# Patient Record
Sex: FEMALE | URBAN - METROPOLITAN AREA
[De-identification: names, ages, dates, MRNs, and addresses within clinical notes are randomized per-mention and may not be internally consistent; named-entity substitution may affect disease eponyms.]

---

## 2018-05-03 ENCOUNTER — APPOINTMENT (RX ONLY)
Dept: URBAN - METROPOLITAN AREA CLINIC 23 | Facility: CLINIC | Age: 83
Setting detail: DERMATOLOGY
End: 2018-05-03

## 2018-05-03 DIAGNOSIS — Z85.828 PERSONAL HISTORY OF OTHER MALIGNANT NEOPLASM OF SKIN: ICD-10-CM

## 2018-05-03 DIAGNOSIS — L82.0 INFLAMED SEBORRHEIC KERATOSIS: ICD-10-CM

## 2018-05-03 PROCEDURE — ? COUNSELING

## 2018-05-03 PROCEDURE — 17110 DESTRUCTION B9 LES UP TO 14: CPT

## 2018-05-03 PROCEDURE — ? LIQUID NITROGEN

## 2018-05-03 PROCEDURE — ? SUNSCREEN RECOMMENDATIONS

## 2018-05-03 PROCEDURE — 99212 OFFICE O/P EST SF 10 MIN: CPT | Mod: 25

## 2018-05-03 ASSESSMENT — LOCATION DETAILED DESCRIPTION DERM
LOCATION DETAILED: MIDDLE STERNUM
LOCATION DETAILED: UPPER STERNUM
LOCATION DETAILED: RIGHT MEDIAL SUPERIOR CHEST

## 2018-05-03 ASSESSMENT — LOCATION ZONE DERM: LOCATION ZONE: TRUNK

## 2018-05-03 ASSESSMENT — LOCATION SIMPLE DESCRIPTION DERM: LOCATION SIMPLE: CHEST

## 2018-05-03 NOTE — PROCEDURE: LIQUID NITROGEN
Render Post-Care Instructions In Note?: no
Post-Care Instructions: I reviewed with the patient in detail post-care instructions. Patient is to wear sunprotection, and avoid picking at any of the treated lesions. Pt may apply Vaseline to crusted or scabbing areas.
Medical Necessity Information: It is in your best interest to select a reason for this procedure from the list below. All of these items fulfill various CMS LCD requirements except the new and changing color options.
Detail Level: Simple
Medical Necessity Clause: This procedure was medically necessary because the lesions that were treated were:
Consent: The patient's consent was obtained including but not limited to risks of crusting, scabbing, blistering, scarring, darker or lighter pigmentary change, recurrence, incomplete removal and infection.

## 2018-05-03 NOTE — PROCEDURE: SUNSCREEN RECOMMENDATIONS

## 2018-08-16 ENCOUNTER — APPOINTMENT (RX ONLY)
Dept: URBAN - METROPOLITAN AREA CLINIC 23 | Facility: CLINIC | Age: 83
Setting detail: DERMATOLOGY
End: 2018-08-16

## 2018-08-16 DIAGNOSIS — Z41.9 ENCOUNTER FOR PROCEDURE FOR PURPOSES OTHER THAN REMEDYING HEALTH STATE, UNSPECIFIED: ICD-10-CM

## 2018-08-16 PROCEDURE — ? FILLERS

## 2018-08-16 NOTE — PROCEDURE: FILLERS
Additional Area 2 Volume In Cc: 0
Use Map Statement For Sites (Optional): No
Lot #: [id number]
Additional Area 1 Volume In Cc: 1
Anesthesia Volume In Cc: 0.2
Additional Area 1 Location: perioral fine lines, lateral mouth, marionette lines
Additional Area 2 Location: temples using an acannula
Additional Area 3 Location: lateral eyes, nasal root
Filler: Restylane-L
Lot #: [id number] [facility]
Additional Area 4 Location: cheeks/Marionette
Topical Anesthesia?: 3% lidocaine, 6% prilocaine
Consent: Written consent obtained. Risks include but not limited to bruising, beading, irregular texture, ulceration, infection, allergic reaction, scar formation, incomplete augmentation, temporary nature, procedural pain.
Additional Anesthesia Volume In Cc: 6
Anesthesia Type: 1% lidocaine without epinephrine
Additional Area 1 Location: using the cannula to bilateral cheeks, marionette lines
Additional Area 1 Location: bilateral cheeks, marionette lines, lateral mouth
Expiration Date (Month Year): 02/2020
Filler: Lorri Smith
Map Statment: See [address] for Complete Details
Post-Care Instructions: Patient instructed to apply ice to reduce swelling.
Additional Area 5 Location: NLFs,tear trough  and lips
Expiration Date (Month Year): 01/31/2021
Additional Area 2 Location: cheeks, lateral jawlines, lateral mouth
Price (Use Numbers Only, No Special Characters Or $): 0.00
Lot #: [id number]
Detail Level: Zone

## 2019-05-16 ENCOUNTER — APPOINTMENT (RX ONLY)
Dept: URBAN - METROPOLITAN AREA CLINIC 23 | Facility: CLINIC | Age: 84
Setting detail: DERMATOLOGY
End: 2019-05-16

## 2019-05-16 DIAGNOSIS — Z85.828 PERSONAL HISTORY OF OTHER MALIGNANT NEOPLASM OF SKIN: ICD-10-CM

## 2019-05-16 DIAGNOSIS — L82.1 OTHER SEBORRHEIC KERATOSIS: ICD-10-CM

## 2019-05-16 DIAGNOSIS — Z41.9 ENCOUNTER FOR PROCEDURE FOR PURPOSES OTHER THAN REMEDYING HEALTH STATE, UNSPECIFIED: ICD-10-CM

## 2019-05-16 PROCEDURE — ? FILLERS

## 2019-05-16 PROCEDURE — ? SUNSCREEN RECOMMENDATIONS

## 2019-05-16 PROCEDURE — ? COUNSELING

## 2019-05-16 PROCEDURE — 99214 OFFICE O/P EST MOD 30 MIN: CPT

## 2019-05-16 ASSESSMENT — LOCATION SIMPLE DESCRIPTION DERM: LOCATION SIMPLE: ABDOMEN

## 2019-05-16 ASSESSMENT — LOCATION ZONE DERM: LOCATION ZONE: TRUNK

## 2019-05-16 ASSESSMENT — LOCATION DETAILED DESCRIPTION DERM: LOCATION DETAILED: EPIGASTRIC SKIN

## 2019-05-16 NOTE — PROCEDURE: FILLERS
Tear Troughs Filler  Volume In Cc: 0
Expiration Date (Month Year): 09/2021
Expiration Date (Month Year): 10/31/2021
Include Cannula Information In Note?: No
Lot #: [id number]
Anesthesia Type: 1% lidocaine without epinephrine
Include Cannula Brand?: DermaSculpt
Expiration Date (Month Year): 08/31/2019
Include Cannula Size?: 25G
Additional Area 1 Location: lateral mouth, fine lines perioral, marionette lines, lateral cheeks, vermillion lips
Additional Anesthesia Volume In Cc: 6
Include Cannula Length?: 1.5 inch
Additional Area 1 Location: lateral mouth, perioral fine lines, vermillion lips, marionette lines
Additional Area 1 Location: cheeks
Consent: Written consent obtained. Risks include but not limited to bruising, beading, irregular texture, ulceration, infection, allergic reaction, scar formation, incomplete augmentation, temporary nature, procedural pain.
Post-Care Instructions: Patient instructed to apply ice to reduce swelling.
Additional Area 2 Location: Lips, oral commissure, glabellar fine fines
Additional Area 2 Location: Nasalabial, Glabellar fine lines- Complimentary
Additional Area 2 Location: cheeks, jawline, oral commissure
Additional Area 3 Location: Glabbellar fine lines, Nasalabial folds, Marionette lines, vermillion lip
Additional Area 3 Location: marionette lines, lateral Jawline, lateral mouth
Additional Area 3 Volume In Cc: 1
Filler: Lorri Smith
Additional Area 4 Location: Perioral
Detail Level: Zone
Additional Area 4 Location: cheeks, lateral jawline, medial cheeks fine lines
Price (Use Numbers Only, No Special Characters Or $): 0.00
Additional Area 5 Location: Cheeks, vermillion lips, marionette fine lines, glabellar fine lines, lateral mouth
Lot #: [id number]
Lot #: [id number]
Map Statment: See [address] for Complete Details

## 2019-09-30 ENCOUNTER — APPOINTMENT (RX ONLY)
Dept: URBAN - METROPOLITAN AREA CLINIC 23 | Facility: CLINIC | Age: 84
Setting detail: DERMATOLOGY
End: 2019-09-30

## 2019-09-30 DIAGNOSIS — Z41.9 ENCOUNTER FOR PROCEDURE FOR PURPOSES OTHER THAN REMEDYING HEALTH STATE, UNSPECIFIED: ICD-10-CM

## 2019-09-30 DIAGNOSIS — L57.0 ACTINIC KERATOSIS: ICD-10-CM

## 2019-09-30 DIAGNOSIS — Z85.828 PERSONAL HISTORY OF OTHER MALIGNANT NEOPLASM OF SKIN: ICD-10-CM

## 2019-09-30 PROCEDURE — 99214 OFFICE O/P EST MOD 30 MIN: CPT | Mod: 25

## 2019-09-30 PROCEDURE — ? DYSPORT

## 2019-09-30 PROCEDURE — 17000 DESTRUCT PREMALG LESION: CPT

## 2019-09-30 PROCEDURE — ? LIQUID NITROGEN

## 2019-09-30 PROCEDURE — ? SUNSCREEN RECOMMENDATIONS

## 2019-09-30 PROCEDURE — ? FILLERS

## 2019-09-30 PROCEDURE — ? COUNSELING

## 2019-09-30 PROCEDURE — 17003 DESTRUCT PREMALG LES 2-14: CPT

## 2019-09-30 ASSESSMENT — LOCATION SIMPLE DESCRIPTION DERM
LOCATION SIMPLE: RIGHT FOREHEAD
LOCATION SIMPLE: LEFT FOREHEAD
LOCATION SIMPLE: LEFT UPPER BACK

## 2019-09-30 ASSESSMENT — LOCATION ZONE DERM
LOCATION ZONE: FACE
LOCATION ZONE: TRUNK

## 2019-09-30 ASSESSMENT — LOCATION DETAILED DESCRIPTION DERM
LOCATION DETAILED: RIGHT SUPERIOR LATERAL FOREHEAD
LOCATION DETAILED: LEFT MID-UPPER BACK
LOCATION DETAILED: LEFT SUPERIOR LATERAL FOREHEAD

## 2019-09-30 NOTE — PROCEDURE: DYSPORT
Nasal Root Units: 0
Detail Level: Simple
Expiration Date (Month Year): 11/30/2019
Additional Area 2 Location: lateral brows
Lot #: [id number]
Additional Area 1 Location: lat brow
Price (Use Numbers Only, No Special Characters Or $): 0.00
Additional Area 3 Location: above brows
Glabellar Complex Units: [address] [facility]
Additional Area 5 Location: glabella
Dilution (U/ 0.1cc): 1.5
Consent: Written consent obtained. Risks include but not limited to lid/brow ptosis, bruising, swelling, diplopia, temporary effect, incomplete chemical denervation.
Additional Area 4 Location: 2cm high forehead

## 2019-09-30 NOTE — PROCEDURE: MIPS QUALITY
Quality 131: Pain Assessment And Follow-Up: Pain assessment using a standardized tool is documented as negative, no follow-up plan required
Detail Level: Detailed
Additional Notes: 0/10 pain

## 2019-09-30 NOTE — PROCEDURE: FILLERS
Additional Area 2 Volume In Cc: 0
Additional Area 2 Volume In Cc: 1
Additional Area 3 Location: Glabbellar fine lines, Nasalabial folds, Marionette lines, vermillion lip
Filler: Lorri Smith
Additional Area 4 Location: Perioral
Additional Area 4 Location: cheeks, lateral jawline, medial cheeks fine lines
Additional Area 5 Location: Cheeks, vermillion lips, marionette fine lines, glabellar fine lines, lateral mouth
Consent: Written consent obtained. Risks include but not limited to bruising, beading, irregular texture, ulceration, infection, allergic reaction, scar formation, incomplete augmentation, temporary nature, procedural pain.
Lot #: [id number]
Detail Level: Zone
Post-Care Instructions: Patient instructed to apply ice to reduce swelling.
Lot #: [id number]
Lot #: [id number]
Price (Use Numbers Only, No Special Characters Or $): 0.00
Include Cannula Information In Note?: No
Expiration Date (Month Year): 09/2021
Expiration Date (Month Year): 2020-02-28
Expiration Date (Month Year): 08/31/2019
Include Cannula Brand?: DermaSculpt
Map Statment: See [address] for Complete Details
Include Cannula Size?: 25G
Anesthesia Type: 1% lidocaine without epinephrine
Additional Area 1 Location: lateral mouth, fine lines perioral, marionette lines, lateral cheeks, vermillion lips
Include Cannula Length?: 1.5 inch
Additional Anesthesia Volume In Cc: 6
Additional Area 1 Location: lateral mouth, perioral fine lines, vermillion lips, marionette lines
Additional Area 1 Location: cheeks
Additional Area 2 Location: Nasalabial, Glabellar fine lines- Complimentary
Additional Area 2 Location: Lips, oral commissure, glabellar fine fines
Additional Area 2 Location: cheeks, jawline, oral commissure, perioral fines, marionette lines

## 2019-09-30 NOTE — PROCEDURE: LIQUID NITROGEN
Number Of Freeze-Thaw Cycles: 1 freeze-thaw cycle
Consent: The patient's consent was obtained including but not limited to risks of crusting, scabbing, blistering, scarring, darker or lighter pigmentary change, recurrence, incomplete removal and infection.
Detail Level: Detailed
Render Note In Bullet Format When Appropriate: No
Duration Of Freeze Thaw-Cycle (Seconds): 10
Post-Care Instructions: I reviewed with the patient in detail post-care instructions. Patient is to wear sunprotection, and avoid picking at any of the treated lesions. Pt may apply Vaseline to crusted or scabbing areas.

## 2020-05-18 ENCOUNTER — APPOINTMENT (RX ONLY)
Dept: URBAN - METROPOLITAN AREA CLINIC 23 | Facility: CLINIC | Age: 85
Setting detail: DERMATOLOGY
End: 2020-05-18

## 2020-05-18 DIAGNOSIS — Z41.9 ENCOUNTER FOR PROCEDURE FOR PURPOSES OTHER THAN REMEDYING HEALTH STATE, UNSPECIFIED: ICD-10-CM

## 2020-05-18 PROCEDURE — ? FILLERS

## 2020-05-18 PROCEDURE — ? DYSPORT

## 2020-05-18 NOTE — PROCEDURE: FILLERS
Additional Area 3 Volume In Cc: 0
Lot #: [id number]
Filler: Lorri Smith
Detail Level: Zone
Expiration Date (Month Year): 09/2021
Include Cannula Size?: 25G
Include Cannula Information In Note?: No
Post-Care Instructions: Patient instructed to apply ice to reduce swelling.
Include Cannula Brand?: DermaSculpt
Price (Use Numbers Only, No Special Characters Or $): 0.00
Expiration Date (Month Year): 08/31/2019
Consent: Written consent obtained. Risks include but not limited to bruising, beading, irregular texture, ulceration, infection, allergic reaction, scar formation, incomplete augmentation, temporary nature, procedural pain.
Include Cannula Length?: 1.5 inch
Map Statment: See [address] for Complete Details
Additional Area 1 Location: lateral mouth, perioral fine lines, vermillion lips, marionette lines
Anesthesia Type: 1% lidocaine without epinephrine
Additional Area 4 Location: lateral jawline, lateral mouth, glabella lines,
Additional Area 1 Location: lateral mouth, fine lines perioral, marionette lines, lateral cheeks, vermillion lips
Additional Area 2 Location: Lips, oral commissure, glabellar fine fines
Additional Area 3 Location: Glabbellar fine lines, Nasalabial folds, Marionette lines, vermillion lip
Additional Area 2 Location: Nasalabial, Glabellar fine lines- Complimentary
Additional Area 5 Location: Cheeks, vermillion lips, marionette fine lines, glabellar fine lines, lateral mouth
Additional Area 4 Location: Perioral
Lot #: [id number]
Expiration Date (Month Year): 2022-08-31
Additional Anesthesia Volume In Cc: 6
Additional Area 1 Volume In Cc: 1
Lot #: [id number]
Additional Area 1 Location: lateral cheeks, lateral jawline, lateral mouth, marionette lines
Additional Area 2 Location: cheeks, jawline, oral commissure

## 2021-03-24 ENCOUNTER — APPOINTMENT (RX ONLY)
Dept: URBAN - METROPOLITAN AREA CLINIC 23 | Facility: CLINIC | Age: 86
Setting detail: DERMATOLOGY
End: 2021-03-24

## 2021-03-24 VITALS — TEMPERATURE: 97.6 F | TEMPERATURE: 97.6 F

## 2021-03-24 DIAGNOSIS — Z41.9 ENCOUNTER FOR PROCEDURE FOR PURPOSES OTHER THAN REMEDYING HEALTH STATE, UNSPECIFIED: ICD-10-CM

## 2021-03-24 DIAGNOSIS — L82.0 INFLAMED SEBORRHEIC KERATOSIS: ICD-10-CM

## 2021-03-24 DIAGNOSIS — L81.4 OTHER MELANIN HYPERPIGMENTATION: ICD-10-CM

## 2021-03-24 DIAGNOSIS — Z85.828 PERSONAL HISTORY OF OTHER MALIGNANT NEOPLASM OF SKIN: ICD-10-CM

## 2021-03-24 PROCEDURE — 17110 DESTRUCTION B9 LES UP TO 14: CPT

## 2021-03-24 PROCEDURE — ? BENIGN DESTRUCTION

## 2021-03-24 PROCEDURE — ? COUNSELING

## 2021-03-24 PROCEDURE — 99213 OFFICE O/P EST LOW 20 MIN: CPT | Mod: 25

## 2021-03-24 PROCEDURE — ? FILLERS

## 2021-03-24 PROCEDURE — ? SUNSCREEN RECOMMENDATIONS

## 2021-03-24 PROCEDURE — ? DYSPORT

## 2021-03-24 ASSESSMENT — LOCATION SIMPLE DESCRIPTION DERM
LOCATION SIMPLE: UPPER BACK
LOCATION SIMPLE: RIGHT FOREHEAD

## 2021-03-24 ASSESSMENT — LOCATION DETAILED DESCRIPTION DERM
LOCATION DETAILED: RIGHT FOREHEAD
LOCATION DETAILED: SUPERIOR THORACIC SPINE
LOCATION DETAILED: RIGHT INFERIOR MEDIAL FOREHEAD

## 2021-03-24 ASSESSMENT — LOCATION ZONE DERM
LOCATION ZONE: FACE
LOCATION ZONE: TRUNK

## 2021-03-24 NOTE — PROCEDURE: DYSPORT
Lcl Root Units: 0
Show Right And Left Brow Units: No
Expiration Date (Month Year): 2021-10
Show Additional Area 5: Yes
Additional Area 4 Location: 2cm high forehead
Additional Area 2 Location: crows feet
Detail Level: Simple
Price (Use Numbers Only, No Special Characters Or $): 0.00
Lot #: [id number]
Additional Area 5 Location: glabella
Dilution (U/ 0.1cc): 1.5
Additional Area 3 Location: high forehead 3 cm above brows
Consent: Written consent obtained. Risks include but not limited to lid/brow ptosis, bruising, swelling, diplopia, temporary effect, incomplete chemical denervation.
Additional Area 1 Location: chin
Glabellar Complex Units: [address]
Additional Area 1 Units: 5

## 2021-03-24 NOTE — PROCEDURE: BENIGN DESTRUCTION
Detail Level: Simple
Render Post-Care Instructions In Note?: yes
Include Z78.9 (Other Specified Conditions Influencing Health Status) As An Associated Diagnosis?: No
Medical Necessity Clause: This procedure was medically necessary because the lesions that were treated were:
Consent: The patient's consent was obtained including but not limited to risks of crusting, scabbing, blistering, scarring, darker or lighter pigmentary change, recurrence, incomplete removal and infection.
Treatment Number (Will Not Render If 0): 0
Anesthesia Volume In Cc: 0.3
Medical Necessity Information: It is in your best interest to select a reason for this procedure from the list below. All of these items fulfill various CMS LCD requirements except the new and changing color options.
Post-Care Instructions: I reviewed with the patient in detail post-care instructions. Patient is to wear sunprotection, and avoid picking at any of the treated lesions. Pt may apply Vaseline to crusted or scabbing areas.

## 2021-03-24 NOTE — PROCEDURE: SUNSCREEN RECOMMENDATIONS

## 2021-03-24 NOTE — PROCEDURE: FILLERS
Jawline Filler Volume In Cc: 0
Additional Anesthesia Volume In Cc: 6
Expiration Date (Month Year): 2023-09
Lot #: [id number]
Include Cannula Information In Note?: No
Expiration Date (Month Year): 09/2021
Lot #: [id number]
Filler: Lorri Smith
Expiration Date (Month Year): 08/31/2019
Detail Level: Zone
Include Cannula Size?: 25G
Include Cannula Brand?: DermaSculpt
Additional Area 1 Location: cheeks, lateral mouth, nlfs , glabella
Price (Use Numbers Only, No Special Characters Or $): 0.00
Include Cannula Length?: 1.5 inch
Additional Area 2 Location: jawline, oral commissure and marionette
Additional Area 1 Location: lateral mouth, perioral fine lines, vermillion lips, marionette lines
Additional Area 2 Volume In Cc: 1
Map Statment: See [address] for Complete Details
Additional Area 2 Location: Lips, oral commissure, glabellar fine fines
Additional Area 1 Location: lateral mouth, fine lines perioral, marionette lines, lateral cheeks, vermillion lips
Additional Area 4 Location: lateral jawline, lateral mouth, glabella lines,
Additional Area 3 Location: Glabbellar fine lines, Nasalabial folds, Marionette lines, vermillion lip
Anesthesia Type: 1% lidocaine without epinephrine
Additional Area 2 Location: Nasalabial, Glabellar fine lines- Complimentary
Additional Area 5 Location: Cheeks, vermillion lips, marionette fine lines, glabellar fine lines, lateral mouth
Additional Area 4 Location: Perioral
Consent: Written consent obtained. Risks include but not limited to bruising, beading, irregular texture, ulceration, infection, allergic reaction, scar formation, incomplete augmentation, temporary nature, procedural pain.
Post-Care Instructions: Patient instructed to apply ice to reduce swelling.
Lot #: [id number]

## 2021-09-20 ENCOUNTER — APPOINTMENT (RX ONLY)
Dept: URBAN - METROPOLITAN AREA CLINIC 23 | Facility: CLINIC | Age: 86
Setting detail: DERMATOLOGY
End: 2021-09-20

## 2021-09-20 DIAGNOSIS — Z41.9 ENCOUNTER FOR PROCEDURE FOR PURPOSES OTHER THAN REMEDYING HEALTH STATE, UNSPECIFIED: ICD-10-CM

## 2021-09-20 PROCEDURE — ? DYSPORT

## 2021-09-20 PROCEDURE — ? FILLERS

## 2021-09-20 NOTE — PROCEDURE: DYSPORT
R Brow Units: 0
Glabellar Complex Units: [address]
Show Masseter Units: Yes
Show Right And Left Periorbital Units: No
Additional Area 1 Location: brows
Additional Area 4 Location: 2cm high forehead
Additional Area 2 Location: crows feet
Additional Area 1 Units: 10
Expiration Date (Month Year): 2022-05
Lot #: [id number]
Price (Use Numbers Only, No Special Characters Or $): 0.00
Detail Level: Simple
Dilution (U/ 0.1cc): 1.5
Consent: Written consent obtained. Risks include but not limited to lid/brow ptosis, bruising, swelling, diplopia, temporary effect, incomplete chemical denervation.
Additional Area 3 Location: high forehead 3 cm above brows
Additional Area 5 Location: glabella

## 2021-09-20 NOTE — PROCEDURE: FILLERS
Brows Filler  Volume In Cc: 0
Additional Area 1 Location: cheeks, lateral mouth, nlfs , glabella
Additional Anesthesia Volume In Cc: 6
Include Cannula Length?: 1.5 inch
Additional Area 2 Location: cheeks, jawline, oral commissure
Additional Area 1 Location: lateral mouth, perioral fine lines, vermillion lips, marionette lines
Additional Area 2 Volume In Cc: 1
Additional Area 2 Location: Lips, oral commissure, glabellar fine fines
Additional Area 1 Location: lateral mouth, fine lines perioral, marionette lines, lateral cheeks, vermillion lips
Detail Level: Zone
Filler: Lorri Smith
Additional Area 2 Location: Nasalabial, Glabellar fine lines- Complimentary
Consent: Written consent obtained. Risks include but not limited to bruising, beading, irregular texture, ulceration, infection, allergic reaction, scar formation, incomplete augmentation, temporary nature, procedural pain.
Additional Area 3 Location: Glabbellar fine lines, Nasalabial folds, Marionette lines, vermillion lip
Price (Use Numbers Only, No Special Characters Or $): 0.00
Post-Care Instructions: Patient instructed to apply ice to reduce swelling.
Additional Area 4 Location: lateral jawline, lateral mouth, glabella lines,
Use Map Statement For Sites (Optional): No
Additional Area 5 Location: Cheeks, vermillion lips, marionette fine lines, glabellar fine lines, lateral mouth
Additional Area 4 Location: Perioral
Map Statment: See [address] for Complete Details
Lot #: [id number]
Lot #: [id number]
Anesthesia Type: 1% lidocaine without epinephrine
Lot #: [id number]
Expiration Date (Month Year): 2024-02
Expiration Date (Month Year): 09/2021
Expiration Date (Month Year): 08/31/2019
Include Cannula Brand?: DermaSculpt
Include Cannula Size?: 25G

## 2023-03-28 ENCOUNTER — APPOINTMENT (RX ONLY)
Dept: URBAN - METROPOLITAN AREA CLINIC 23 | Facility: CLINIC | Age: 88
Setting detail: DERMATOLOGY
End: 2023-03-28

## 2023-03-28 DIAGNOSIS — D18.0 HEMANGIOMA: ICD-10-CM

## 2023-03-28 DIAGNOSIS — D49.2 NEOPLASM OF UNSPECIFIED BEHAVIOR OF BONE, SOFT TISSUE, AND SKIN: ICD-10-CM

## 2023-03-28 DIAGNOSIS — L81.4 OTHER MELANIN HYPERPIGMENTATION: ICD-10-CM

## 2023-03-28 DIAGNOSIS — L82.1 OTHER SEBORRHEIC KERATOSIS: ICD-10-CM

## 2023-03-28 DIAGNOSIS — Z85.828 PERSONAL HISTORY OF OTHER MALIGNANT NEOPLASM OF SKIN: ICD-10-CM

## 2023-03-28 PROBLEM — D23.72 OTHER BENIGN NEOPLASM OF SKIN OF LEFT LOWER LIMB, INCLUDING HIP: Status: ACTIVE | Noted: 2023-03-28

## 2023-03-28 PROBLEM — D18.01 HEMANGIOMA OF SKIN AND SUBCUTANEOUS TISSUE: Status: ACTIVE | Noted: 2023-03-28

## 2023-03-28 PROCEDURE — ? SHAVE REMOVAL AND DESTRUCTION

## 2023-03-28 PROCEDURE — 17262 DSTRJ MAL LES T/A/L 1.1-2.0: CPT

## 2023-03-28 PROCEDURE — ? COUNSELING

## 2023-03-28 PROCEDURE — 99213 OFFICE O/P EST LOW 20 MIN: CPT | Mod: 25

## 2023-03-28 ASSESSMENT — LOCATION DETAILED DESCRIPTION DERM
LOCATION DETAILED: INFERIOR THORACIC SPINE
LOCATION DETAILED: LEFT SUPERIOR MEDIAL UPPER BACK
LOCATION DETAILED: SUBXIPHOID
LOCATION DETAILED: RIGHT MEDIAL UPPER BACK

## 2023-03-28 ASSESSMENT — LOCATION SIMPLE DESCRIPTION DERM
LOCATION SIMPLE: UPPER BACK
LOCATION SIMPLE: ABDOMEN
LOCATION SIMPLE: LEFT UPPER BACK
LOCATION SIMPLE: RIGHT UPPER BACK

## 2023-03-28 ASSESSMENT — LOCATION ZONE DERM: LOCATION ZONE: TRUNK

## 2023-03-28 NOTE — PROCEDURE: SHAVE REMOVAL AND DESTRUCTION
Detail Level: Simple
Size Of Lesion In Cm: 0.9
Size After Destruction (Required For Destruction Billing): 1.5
Anesthesia Type: 1% lidocaine with epinephrine
Anesthesia Volume In Cc: 0.6
Anesthesia Volume In Cc: 0
Hemostasis: Drysol
Cautery Type: electrodesiccation
Was Curettage Performed?: Yes
Number Of Curettages: 3
Wound Care: Petrolatum
Dressing: dry sterile dressing
Bill As?: Note: Bill Malignant Destruction If Path Confirms Malignant Lesion. Only Bill As Shave Removal If Path Comes Back Benign. Do Not Bill Shave Removal On Malignant Lesions.: Malignant Destruction
Lab: [facility]
Lab Facility: [id number]
Render Path Notes In Note?: No
Consent: Written consent was obtained and risks were reviewed including but not limited to scarring, infection, bleeding, scabbing, incomplete removal, nerve damage and allergy to anesthesia.
Post-Care Instructions: I reviewed with the patient in detail post-care instructions. Patient is to keep the biopsy site dry overnight, and then apply bacitracin twice daily until healed. Patient may apply hydrogen peroxide soaks to remove any crusting.
Notification Instructions: Patient will be notified of biopsy results. However, patient instructed to call the office if not contacted within 2 weeks.
Billing Type: [employer]

## 2023-09-22 ENCOUNTER — APPOINTMENT (RX ONLY)
Dept: URBAN - METROPOLITAN AREA CLINIC 23 | Facility: CLINIC | Age: 88
Setting detail: DERMATOLOGY
End: 2023-09-22

## 2023-09-22 PROBLEM — C44.519 BASAL CELL CARCINOMA OF SKIN OF OTHER PART OF TRUNK: Status: ACTIVE | Noted: 2023-09-22

## 2023-09-22 PROCEDURE — ? ADDITIONAL NOTES

## 2023-09-22 PROCEDURE — 99212 OFFICE O/P EST SF 10 MIN: CPT

## 2023-09-22 PROCEDURE — ? COUNSELING

## 2023-09-22 NOTE — PROCEDURE: ADDITIONAL NOTES
Additional Notes: Pt had lesion treated at time of biopsy, certified letter sent in error, no sign of lesion present upon examination today
Render Risk Assessment In Note?: no
Detail Level: Detailed

## 2024-01-11 ENCOUNTER — APPOINTMENT (RX ONLY)
Dept: URBAN - METROPOLITAN AREA CLINIC 23 | Facility: CLINIC | Age: 89
Setting detail: DERMATOLOGY
End: 2024-01-11

## 2024-01-11 DIAGNOSIS — Z85.828 PERSONAL HISTORY OF OTHER MALIGNANT NEOPLASM OF SKIN: ICD-10-CM

## 2024-01-11 DIAGNOSIS — L81.4 OTHER MELANIN HYPERPIGMENTATION: ICD-10-CM

## 2024-01-11 DIAGNOSIS — L90.5 SCAR CONDITIONS AND FIBROSIS OF SKIN: ICD-10-CM

## 2024-01-11 PROCEDURE — ? COUNSELING

## 2024-01-11 PROCEDURE — 99213 OFFICE O/P EST LOW 20 MIN: CPT

## 2024-01-11 ASSESSMENT — LOCATION ZONE DERM: LOCATION ZONE: TRUNK

## 2024-01-11 ASSESSMENT — LOCATION SIMPLE DESCRIPTION DERM: LOCATION SIMPLE: RIGHT UPPER BACK

## 2024-01-11 ASSESSMENT — LOCATION DETAILED DESCRIPTION DERM: LOCATION DETAILED: RIGHT MEDIAL UPPER BACK

## 2024-07-08 NOTE — HPI: EVALUATION OF SKIN LESION(S)
How Severe Are Your Spot(S)?: moderate
Have Your Spot(S) Been Treated In The Past?: has not been treated
Hpi Title: Evaluation of Skin Lesions
01-Sep-2024